# Patient Record
Sex: FEMALE | Race: WHITE | Employment: UNEMPLOYED | URBAN - METROPOLITAN AREA
[De-identification: names, ages, dates, MRNs, and addresses within clinical notes are randomized per-mention and may not be internally consistent; named-entity substitution may affect disease eponyms.]

---

## 2019-08-23 ENCOUNTER — HOSPITAL ENCOUNTER (OUTPATIENT)
Dept: PERINATAL CARE | Age: 34
Discharge: HOME | End: 2019-08-23
Attending: OBSTETRICS & GYNECOLOGY
Payer: OTHER GOVERNMENT

## 2019-08-23 LAB
CHLAMYDIA, EXTERNAL: NEGATIVE
HBSAG, EXTERNAL: NEGATIVE
HIV, EXTERNAL: NEGATIVE
N. GONORRHEA, EXTERNAL: NEGATIVE
RPR, EXTERNAL: NON REACTIVE
RUBELLA, EXTERNAL: NORMAL

## 2019-08-23 PROCEDURE — 76805 OB US >/= 14 WKS SNGL FETUS: CPT | Performed by: OBSTETRICS & GYNECOLOGY

## 2019-10-11 ENCOUNTER — HOSPITAL ENCOUNTER (OUTPATIENT)
Dept: PERINATAL CARE | Age: 34
Discharge: HOME | End: 2019-10-11
Attending: OBSTETRICS & GYNECOLOGY
Payer: OTHER GOVERNMENT

## 2019-10-11 PROCEDURE — 76816 OB US FOLLOW-UP PER FETUS: CPT | Performed by: OBSTETRICS & GYNECOLOGY

## 2019-11-05 LAB — GRBS, EXTERNAL: NEGATIVE

## 2019-11-07 NOTE — H&P
[facility]  HISTORY AND PHYSICAL    Name:  Jose Moulton  MR#:   [id number]  :  1985  ACCOUNT #:  [de-identified]  ADMIT DATE:  2019    She is coming in at approximately noon time for surgery on 2019    CHIEF COMPLAINT:  Intrauterine pregnancy at 44 weeks' gestation, group B strep status negative, for repeat  section, and known fibroid uterus. HISTORY OF PRESENT ILLNESS:  The patient is a 72-year-old  2, para 1, AB 0,  female whose due date is well established to be 2019, also confirmed by Maternal Fetal Medicine with the baby in the 72nd percentile as of 2019, who had mild polyhydramnios which had resolved by her last study at Maternal Fetal Medicine, and who had a PIH panel done also the later part of October, which confirmed normal [address] labs, who is now in at this time with long causing posterior cervix, who has a known anterior fundal placenta and the big fear is a pedunculated fibroid approximately 3 cm, mutation not specified, for repeat  section. The patient delivered by primary  section in  at 39 weeks' gestation of an 8-pound-2-ounce male infant who had fetal distress. The patient had a known fibroid uterus at that time. The patient has also been consulted at this time with the fibroid accessible for removal simply and reasonably we may remove the pedunculated fibroid if I have her stable and without other irregularity may be left in situ. ALLERGIES:  THE PATIENT HAS NO KNOWN DRUG ALLERGIES. SOCIAL HISTORY:  She has quit smoking. Class I Pap smear. The patient's 1-hour sugar test at 30 weeks was normal.  Vitamin D level was 32.2. A nonstress test had all been reactive during these last several weeks. This was done because the initial finding of mild polyhydramnios. The patient's blood type is O positive, class I pap smear, nonreactive VDRL. GC and CT cultures negative.   Rubella immune, hepatitis, and AIDS screens are negative. RPR is nonreactive. FAMILY HISTORY:  The grandparents who had colon cancer and breast cancer. Other specifics not given. Spouse's name is Carlo Rodriguez . REVIEW OF SYSTEMS:  Otherwise noncontributory except for given above. PHYSICAL EXAMINATION:  GENERAL:  Well-developed, well-nourished,  female. VITAL SIGNS:  5 feet 6 inches tall, weight 223 pounds. Blood pressure 121/83. HEAD, EYES, EARS, NOSE, AND THROAT:  Normal.  CHEST:  Clear. BREASTS:  Without extraneous masses. CARDIAC:  Normal.  ABDOMEN:  Reveals an estimated fetal weight of about 7.5 to 8 pounds. Cervix is long, posterior, and closed. The patient has a well-healed Pfannenstiel incision site. Heart beat in the left lower quadrant 140 beats per minute.   Rest of the exam including neurologic is otherwise normal.      Mack Galindo MD      [id number]  D:  11/07/2019 12:24  T:  11/07/2019 17:12  JOB #:  [id number]

## 2019-11-12 ENCOUNTER — HOSPITAL ENCOUNTER (OUTPATIENT)
Dept: PREADMISSION TESTING | Age: 34
Discharge: HOME | DRG: 773 | End: 2019-11-12
Payer: OTHER GOVERNMENT

## 2019-11-12 LAB
ABO + RH BLD: NORMAL
AMORPH CRY URNS QL MICRO: ABNORMAL
APPEARANCE UR: CLEAR
BACTERIA URNS QL MICRO: ABNORMAL /HPF
BASOPHILS # BLD: 0 K/UL (ref 0–0.1)
BASOPHILS NFR BLD: 0 % (ref 0–2)
BILIRUB UR QL: NEGATIVE
BLOOD GROUP ANTIBODIES SERPL: NORMAL
CAOX CRY URNS QL MICRO: ABNORMAL
COLOR UR: YELLOW
DIFFERENTIAL METHOD BLD: ABNORMAL
EOSINOPHIL # BLD: 0.1 K/UL (ref 0–0.4)
EOSINOPHIL NFR BLD: 1 % (ref 0–5)
EPITH CASTS URNS QL MICRO: ABNORMAL /LPF (ref 0–5)
ERYTHROCYTE [DISTWIDTH] IN BLOOD BY AUTOMATED COUNT: 13.7 % (ref 11.6–14.5)
GLUCOSE UR STRIP.AUTO-MCNC: NEGATIVE MG/DL
HCT VFR BLD AUTO: 35 % (ref 35–45)
HGB BLD-MCNC: 11.4 G/DL (ref 12–16)
HGB UR QL STRIP: NEGATIVE
KETONES UR QL STRIP.AUTO: ABNORMAL MG/DL
LEUKOCYTE ESTERASE UR QL STRIP.AUTO: ABNORMAL
LYMPHOCYTES # BLD: 1.3 K/UL (ref 0.9–3.6)
LYMPHOCYTES NFR BLD: 17 % (ref 21–52)
MCH RBC QN AUTO: 28.3 PG (ref 24–34)
MCHC RBC AUTO-ENTMCNC: 32.6 G/DL (ref 31–37)
MCV RBC AUTO: 86.8 FL (ref 74–97)
MONOCYTES # BLD: 0.5 K/UL (ref 0.05–1.2)
MONOCYTES NFR BLD: 7 % (ref 3–10)
NEUTS SEG # BLD: 5.5 K/UL (ref 1.8–8)
NEUTS SEG NFR BLD: 75 % (ref 40–73)
NITRITE UR QL STRIP.AUTO: NEGATIVE
PH UR STRIP: 6.5 [PH] (ref 5–8)
PLATELET # BLD AUTO: 270 K/UL (ref 135–420)
PMV BLD AUTO: 10.5 FL (ref 9.2–11.8)
PROT UR STRIP-MCNC: NEGATIVE MG/DL
RBC # BLD AUTO: 4.03 M/UL (ref 4.2–5.3)
RBC #/AREA URNS HPF: ABNORMAL /HPF (ref 0–5)
SP GR UR REFRACTOMETRY: 1.02 (ref 1–1.03)
SPECIMEN EXP DATE BLD: NORMAL
UROBILINOGEN UR QL STRIP.AUTO: 1 EU/DL (ref 0.2–1)
WBC # BLD AUTO: 7.3 K/UL (ref 4.6–13.2)
WBC URNS QL MICRO: ABNORMAL /HPF (ref 0–5)

## 2019-11-12 PROCEDURE — 86900 BLOOD TYPING SEROLOGIC ABO: CPT

## 2019-11-12 PROCEDURE — 86592 SYPHILIS TEST NON-TREP QUAL: CPT

## 2019-11-12 PROCEDURE — 81001 URINALYSIS AUTO W/SCOPE: CPT

## 2019-11-12 PROCEDURE — 36415 COLL VENOUS BLD VENIPUNCTURE: CPT

## 2019-11-12 PROCEDURE — 85025 COMPLETE CBC W/AUTO DIFF WBC: CPT

## 2019-11-13 ENCOUNTER — ANESTHESIA (OUTPATIENT)
Dept: LABOR AND DELIVERY | Age: 34
DRG: 773 | End: 2019-11-13
Payer: OTHER GOVERNMENT

## 2019-11-13 ENCOUNTER — ANESTHESIA EVENT (OUTPATIENT)
Dept: LABOR AND DELIVERY | Age: 34
DRG: 773 | End: 2019-11-13
Payer: OTHER GOVERNMENT

## 2019-11-13 ENCOUNTER — HOSPITAL ENCOUNTER (INPATIENT)
Age: 34
LOS: 2 days | Discharge: HOME | DRG: 773 | End: 2019-11-15
Attending: OBSTETRICS & GYNECOLOGY | Admitting: OBSTETRICS & GYNECOLOGY
Payer: OTHER GOVERNMENT

## 2019-11-13 PROBLEM — Z3A.39 39 WEEKS GESTATION OF PREGNANCY: Status: ACTIVE | Noted: 2019-11-13

## 2019-11-13 PROBLEM — Z3A.39 39 WEEKS GESTATION OF PREGNANCY: Status: RESOLVED | Noted: 2019-11-13 | Resolved: 2019-11-13

## 2019-11-13 PROBLEM — Z98.891 H/O CESAREAN SECTION: Status: ACTIVE | Noted: 2019-11-13

## 2019-11-13 PROBLEM — Z98.891 H/O CESAREAN SECTION: Status: RESOLVED | Noted: 2019-11-13 | Resolved: 2019-11-13

## 2019-11-13 LAB — RPR SER QL: NONREACTIVE

## 2019-11-13 PROCEDURE — 77030002888 HC SUT CHRMC J&J -A: Performed by: OBSTETRICS & GYNECOLOGY

## 2019-11-13 PROCEDURE — 59025 FETAL NON-STRESS TEST: CPT

## 2019-11-13 PROCEDURE — 74011250637 HC RX REV CODE- 250/637

## 2019-11-13 PROCEDURE — 74011250636 HC RX REV CODE- 250/636

## 2019-11-13 PROCEDURE — 74011250636 HC RX REV CODE- 250/636: Performed by: NURSE ANESTHETIST, CERTIFIED REGISTERED

## 2019-11-13 PROCEDURE — 74011000258 HC RX REV CODE- 258: Performed by: NURSE ANESTHETIST, CERTIFIED REGISTERED

## 2019-11-13 PROCEDURE — 77030040361 HC SLV COMPR DVT MDII -B: Performed by: OBSTETRICS & GYNECOLOGY

## 2019-11-13 PROCEDURE — 77030027138 HC INCENT SPIROMETER -A

## 2019-11-13 PROCEDURE — 76010000392 HC C SECN EA ADDL 0.5 HR: Performed by: OBSTETRICS & GYNECOLOGY

## 2019-11-13 PROCEDURE — 75410000003 HC RECOV DEL/VAG/CSECN EA 0.5 HR

## 2019-11-13 PROCEDURE — 74011000250 HC RX REV CODE- 250: Performed by: SPECIALIST

## 2019-11-13 PROCEDURE — 88305 TISSUE EXAM BY PATHOLOGIST: CPT

## 2019-11-13 PROCEDURE — 77030031139 HC SUT VCRL2 J&J -A: Performed by: OBSTETRICS & GYNECOLOGY

## 2019-11-13 PROCEDURE — 74011000250 HC RX REV CODE- 250: Performed by: NURSE ANESTHETIST, CERTIFIED REGISTERED

## 2019-11-13 PROCEDURE — 76060000035 HC ANESTHESIA 2 TO 2.5 HR: Performed by: OBSTETRICS & GYNECOLOGY

## 2019-11-13 PROCEDURE — 74011250636 HC RX REV CODE- 250/636: Performed by: OBSTETRICS & GYNECOLOGY

## 2019-11-13 PROCEDURE — 77030002933 HC SUT MCRYL J&J -A: Performed by: OBSTETRICS & GYNECOLOGY

## 2019-11-13 PROCEDURE — 65270000029 HC RM PRIVATE

## 2019-11-13 PROCEDURE — 75410000003 HC RECOV DEL/VAG/CSECN EA 0.5 HR: Performed by: OBSTETRICS & GYNECOLOGY

## 2019-11-13 PROCEDURE — 88311 DECALCIFY TISSUE: CPT

## 2019-11-13 PROCEDURE — 0UB90ZZ EXCISION OF UTERUS, OPEN APPROACH: ICD-10-PCS | Performed by: OBSTETRICS & GYNECOLOGY

## 2019-11-13 PROCEDURE — 74011250636 HC RX REV CODE- 250/636: Performed by: SPECIALIST

## 2019-11-13 PROCEDURE — 76010000391 HC C SECN FIRST 1 HR: Performed by: OBSTETRICS & GYNECOLOGY

## 2019-11-13 RX ORDER — EPHEDRINE SULFATE/0.9% NACL/PF 50 MG/5 ML
SYRINGE (ML) INTRAVENOUS AS NEEDED
Status: DISCONTINUED | OUTPATIENT
Start: 2019-11-13 | End: 2019-11-13 | Stop reason: HOSPADM

## 2019-11-13 RX ORDER — NALOXONE HYDROCHLORIDE 0.4 MG/ML
0.4 INJECTION, SOLUTION INTRAMUSCULAR; INTRAVENOUS; SUBCUTANEOUS
Status: ACTIVE | OUTPATIENT
Start: 2019-11-13 | End: 2019-11-14

## 2019-11-13 RX ORDER — SODIUM CHLORIDE, SODIUM LACTATE, POTASSIUM CHLORIDE, CALCIUM CHLORIDE 600; 310; 30; 20 MG/100ML; MG/100ML; MG/100ML; MG/100ML
125 INJECTION, SOLUTION INTRAVENOUS CONTINUOUS
Status: DISCONTINUED | OUTPATIENT
Start: 2019-11-13 | End: 2019-11-15 | Stop reason: HOSPADM

## 2019-11-13 RX ORDER — BUPIVACAINE HYDROCHLORIDE 7.5 MG/ML
INJECTION, SOLUTION INTRASPINAL AS NEEDED
Status: DISCONTINUED | OUTPATIENT
Start: 2019-11-13 | End: 2019-11-13 | Stop reason: HOSPADM

## 2019-11-13 RX ORDER — DIPHENHYDRAMINE HYDROCHLORIDE 50 MG/ML
25 INJECTION, SOLUTION INTRAMUSCULAR; INTRAVENOUS
Status: DISCONTINUED | OUTPATIENT
Start: 2019-11-13 | End: 2019-11-15 | Stop reason: HOSPADM

## 2019-11-13 RX ORDER — PROMETHAZINE HYDROCHLORIDE 25 MG/ML
25 INJECTION, SOLUTION INTRAMUSCULAR; INTRAVENOUS
Status: DISCONTINUED | OUTPATIENT
Start: 2019-11-13 | End: 2019-11-15 | Stop reason: HOSPADM

## 2019-11-13 RX ORDER — ACETAMINOPHEN 325 MG/1
650 TABLET ORAL
Status: DISCONTINUED | OUTPATIENT
Start: 2019-11-13 | End: 2019-11-15 | Stop reason: HOSPADM

## 2019-11-13 RX ORDER — ONDANSETRON 2 MG/ML
4 INJECTION INTRAMUSCULAR; INTRAVENOUS
Status: DISCONTINUED | OUTPATIENT
Start: 2019-11-13 | End: 2019-11-15 | Stop reason: HOSPADM

## 2019-11-13 RX ORDER — OXYTOCIN/RINGER'S LACTATE 20/1000 ML
PLASTIC BAG, INJECTION (ML) INTRAVENOUS
Status: DISCONTINUED | OUTPATIENT
Start: 2019-11-13 | End: 2019-11-13 | Stop reason: HOSPADM

## 2019-11-13 RX ORDER — NALOXONE HYDROCHLORIDE 0.4 MG/ML
0.2 INJECTION, SOLUTION INTRAMUSCULAR; INTRAVENOUS; SUBCUTANEOUS
Status: ACTIVE | OUTPATIENT
Start: 2019-11-13 | End: 2019-11-14

## 2019-11-13 RX ORDER — MORPHINE SULFATE 1 MG/ML
INJECTION, SOLUTION EPIDURAL; INTRATHECAL; INTRAVENOUS AS NEEDED
Status: DISCONTINUED | OUTPATIENT
Start: 2019-11-13 | End: 2019-11-13 | Stop reason: HOSPADM

## 2019-11-13 RX ORDER — KETOROLAC TROMETHAMINE 30 MG/ML
INJECTION, SOLUTION INTRAMUSCULAR; INTRAVENOUS
Status: COMPLETED
Start: 2019-11-13 | End: 2019-11-13

## 2019-11-13 RX ORDER — CEFAZOLIN SODIUM/WATER 2 G/20 ML
2 SYRINGE (ML) INTRAVENOUS EVERY 6 HOURS
Status: DISCONTINUED | OUTPATIENT
Start: 2019-11-13 | End: 2019-11-13 | Stop reason: HOSPADM

## 2019-11-13 RX ORDER — LORATADINE 10 MG/1
10 TABLET ORAL DAILY PRN
Status: DISCONTINUED | OUTPATIENT
Start: 2019-11-13 | End: 2019-11-15 | Stop reason: HOSPADM

## 2019-11-13 RX ORDER — METHYLERGONOVINE MALEATE 0.2 MG/ML
INJECTION INTRAVENOUS AS NEEDED
Status: DISCONTINUED | OUTPATIENT
Start: 2019-11-13 | End: 2019-11-13 | Stop reason: HOSPADM

## 2019-11-13 RX ORDER — KETOROLAC TROMETHAMINE 30 MG/ML
30 INJECTION, SOLUTION INTRAMUSCULAR; INTRAVENOUS
Status: DISCONTINUED | OUTPATIENT
Start: 2019-11-13 | End: 2019-11-15 | Stop reason: HOSPADM

## 2019-11-13 RX ORDER — KETOROLAC TROMETHAMINE 30 MG/ML
30 INJECTION, SOLUTION INTRAMUSCULAR; INTRAVENOUS EVERY 6 HOURS
Status: DISCONTINUED | OUTPATIENT
Start: 2019-11-13 | End: 2019-11-15

## 2019-11-13 RX ORDER — CEFAZOLIN SODIUM/WATER 2 G/20 ML
2 SYRINGE (ML) INTRAVENOUS ONCE
Status: DISCONTINUED | OUTPATIENT
Start: 2019-11-13 | End: 2019-11-13

## 2019-11-13 RX ORDER — IBUPROFEN 400 MG/1
800 TABLET ORAL
Status: DISCONTINUED | OUTPATIENT
Start: 2019-11-16 | End: 2019-11-15

## 2019-11-13 RX ORDER — OXYTOCIN/0.9 % SODIUM CHLORIDE 20/1000 ML
125 PLASTIC BAG, INJECTION (ML) INTRAVENOUS CONTINUOUS
Status: DISCONTINUED | OUTPATIENT
Start: 2019-11-13 | End: 2019-11-15 | Stop reason: HOSPADM

## 2019-11-13 RX ORDER — OXYTOCIN/0.9 % SODIUM CHLORIDE 20/1000 ML
PLASTIC BAG, INJECTION (ML) INTRAVENOUS
Status: COMPLETED
Start: 2019-11-13 | End: 2019-11-13

## 2019-11-13 RX ORDER — SODIUM CHLORIDE, SODIUM LACTATE, POTASSIUM CHLORIDE, CALCIUM CHLORIDE 600; 310; 30; 20 MG/100ML; MG/100ML; MG/100ML; MG/100ML
125 INJECTION, SOLUTION INTRAVENOUS CONTINUOUS
Status: DISPENSED | OUTPATIENT
Start: 2019-11-13 | End: 2019-11-14

## 2019-11-13 RX ORDER — SODIUM CHLORIDE 0.9 % (FLUSH) 0.9 %
5-40 SYRINGE (ML) INJECTION EVERY 8 HOURS
Status: DISCONTINUED | OUTPATIENT
Start: 2019-11-13 | End: 2019-11-15 | Stop reason: HOSPADM

## 2019-11-13 RX ORDER — MISOPROSTOL 100 UG/1
1000 TABLET ORAL ONCE
Status: COMPLETED | OUTPATIENT
Start: 2019-11-13 | End: 2019-11-13

## 2019-11-13 RX ORDER — ZOLPIDEM TARTRATE 5 MG/1
5 TABLET ORAL
Status: DISCONTINUED | OUTPATIENT
Start: 2019-11-13 | End: 2019-11-15 | Stop reason: HOSPADM

## 2019-11-13 RX ORDER — HYDROMORPHONE HYDROCHLORIDE 1 MG/ML
0.2 INJECTION, SOLUTION INTRAMUSCULAR; INTRAVENOUS; SUBCUTANEOUS
Status: DISPENSED | OUTPATIENT
Start: 2019-11-13 | End: 2019-11-14

## 2019-11-13 RX ORDER — SODIUM CHLORIDE 0.9 % (FLUSH) 0.9 %
5-40 SYRINGE (ML) INJECTION AS NEEDED
Status: DISCONTINUED | OUTPATIENT
Start: 2019-11-13 | End: 2019-11-15 | Stop reason: HOSPADM

## 2019-11-13 RX ORDER — MISOPROSTOL 100 UG/1
TABLET ORAL
Status: COMPLETED
Start: 2019-11-13 | End: 2019-11-13

## 2019-11-13 RX ORDER — ACETAMINOPHEN 325 MG/1
650 TABLET ORAL
Status: DISCONTINUED | OUTPATIENT
Start: 2019-11-13 | End: 2019-11-13

## 2019-11-13 RX ORDER — FACIAL-BODY WIPES
10 EACH TOPICAL
Status: DISCONTINUED | OUTPATIENT
Start: 2019-11-13 | End: 2019-11-15 | Stop reason: HOSPADM

## 2019-11-13 RX ORDER — ONDANSETRON 2 MG/ML
INJECTION INTRAMUSCULAR; INTRAVENOUS AS NEEDED
Status: DISCONTINUED | OUTPATIENT
Start: 2019-11-13 | End: 2019-11-13 | Stop reason: HOSPADM

## 2019-11-13 RX ORDER — OXYCODONE AND ACETAMINOPHEN 5; 325 MG/1; MG/1
1-2 TABLET ORAL
Status: DISCONTINUED | OUTPATIENT
Start: 2019-11-13 | End: 2019-11-15 | Stop reason: HOSPADM

## 2019-11-13 RX ORDER — CEFAZOLIN SODIUM/WATER 2 G/20 ML
2 SYRINGE (ML) INTRAVENOUS ONCE
Status: DISCONTINUED | OUTPATIENT
Start: 2019-11-13 | End: 2019-11-13 | Stop reason: HOSPADM

## 2019-11-13 RX ORDER — SIMETHICONE 80 MG
80 TABLET,CHEWABLE ORAL
Status: DISCONTINUED | OUTPATIENT
Start: 2019-11-13 | End: 2019-11-15 | Stop reason: HOSPADM

## 2019-11-13 RX ORDER — LIDOCAINE HYDROCHLORIDE 10 MG/ML
INJECTION INFILTRATION; PERINEURAL AS NEEDED
Status: DISCONTINUED | OUTPATIENT
Start: 2019-11-13 | End: 2019-11-13 | Stop reason: HOSPADM

## 2019-11-13 RX ORDER — FENTANYL CITRATE 50 UG/ML
INJECTION, SOLUTION INTRAMUSCULAR; INTRAVENOUS AS NEEDED
Status: DISCONTINUED | OUTPATIENT
Start: 2019-11-13 | End: 2019-11-13 | Stop reason: HOSPADM

## 2019-11-13 RX ADMIN — KETOROLAC TROMETHAMINE 30 MG: 30 INJECTION, SOLUTION INTRAMUSCULAR at 18:40

## 2019-11-13 RX ADMIN — Medication 125 ML/HR: at 18:32

## 2019-11-13 RX ADMIN — FENTANYL CITRATE 20 MCG: 50 INJECTION, SOLUTION INTRAMUSCULAR; INTRAVENOUS at 16:14

## 2019-11-13 RX ADMIN — Medication 10 MG: at 16:30

## 2019-11-13 RX ADMIN — SODIUM CHLORIDE, SODIUM LACTATE, POTASSIUM CHLORIDE, AND CALCIUM CHLORIDE: 600; 310; 30; 20 INJECTION, SOLUTION INTRAVENOUS at 16:16

## 2019-11-13 RX ADMIN — MISOPROSTOL 1000 MCG: 100 TABLET ORAL at 18:00

## 2019-11-13 RX ADMIN — PHENYLEPHRINE HYDROCHLORIDE 100 MCG: 10 INJECTION INTRAVENOUS at 16:19

## 2019-11-13 RX ADMIN — ONDANSETRON HYDROCHLORIDE 4 MG: 2 INJECTION INTRAMUSCULAR; INTRAVENOUS at 16:45

## 2019-11-13 RX ADMIN — MORPHINE SULFATE 0.15 MG: 1 INJECTION, SOLUTION EPIDURAL; INTRATHECAL; INTRAVENOUS at 16:14

## 2019-11-13 RX ADMIN — LIDOCAINE HYDROCHLORIDE 8 ML: 10 INJECTION, SOLUTION INFILTRATION; PERINEURAL at 16:10

## 2019-11-13 RX ADMIN — METHYLERGONOVINE MALEATE 0.2 MG: 0.2 INJECTION, SOLUTION INTRAMUSCULAR; INTRAVENOUS at 16:44

## 2019-11-13 RX ADMIN — Medication 999 ML/HR: at 16:40

## 2019-11-13 RX ADMIN — KETOROLAC TROMETHAMINE 30 MG: 30 INJECTION, SOLUTION INTRAMUSCULAR; INTRAVENOUS at 18:40

## 2019-11-13 RX ADMIN — PHENYLEPHRINE HYDROCHLORIDE 100 MCG: 10 INJECTION INTRAVENOUS at 16:24

## 2019-11-13 RX ADMIN — Medication 2 G: at 16:12

## 2019-11-13 RX ADMIN — SODIUM CHLORIDE, SODIUM LACTATE, POTASSIUM CHLORIDE, AND CALCIUM CHLORIDE 125 ML/HR: 600; 310; 30; 20 INJECTION, SOLUTION INTRAVENOUS at 15:00

## 2019-11-13 RX ADMIN — BUPIVACAINE HYDROCHLORIDE IN DEXTROSE 1.5 ML: 7.5 INJECTION, SOLUTION SUBARACHNOID at 16:14

## 2019-11-13 RX ADMIN — SODIUM CHLORIDE, SODIUM LACTATE, POTASSIUM CHLORIDE, AND CALCIUM CHLORIDE 1000 ML: 600; 310; 30; 20 INJECTION, SOLUTION INTRAVENOUS at 13:30

## 2019-11-13 RX ADMIN — Medication 10 MG: at 16:32

## 2019-11-13 NOTE — PROGRESS NOTES
18 Pt is a 30 y.o.  at 39w0d, arrived to room 3 for scheduled  with Dr. Kaylan Clark. She states she is has no pain. no contractions, no vaginal bleeding, Denies LOF, Normal FM. Tindall and EFM placed. 1400 Dr. Alicia Wilson aware of patient drinking coffee with cream and sugar. To delay  until 1600. Dr. Kaylan Clark notified. 1601 arrived to OR    1620 fetal heart rate 150    1802 Patient back in room 3 from [address] [place] [address] Bedside and Verbal shift change report given to ALICIA Alvaerz RN (oncoming nurse) by Kristopher Saenz. MERNA Dasilva (offgoing nurse). Report included the following information SBAR, Kardex, Intake/Output, MAR, Recent Results and Med Rec Status.

## 2019-11-13 NOTE — PROGRESS NOTES
Problem:  Delivery: Day of Delivery  Goal: Off Pathway (Use only if patient is Off Pathway)  Outcome: Progressing Towards Goal  Goal: Activity/Safety  Outcome: Progressing Towards Goal  Goal: Consults, if ordered  Outcome: Progressing Towards Goal  Goal: Diagnostic Test/Procedures  Outcome: Progressing Towards Goal  Goal: Nutrition/Diet  Outcome: Progressing Towards Goal  Goal: Discharge Planning  Outcome: Progressing Towards Goal  Goal: Medications  Outcome: Progressing Towards Goal  Goal: Respiratory  Outcome: Progressing Towards Goal  Goal: Treatments/Interventions/Procedures  Outcome: Progressing Towards Goal  Goal: Psychosocial  Outcome: Progressing Towards Goal  Goal: *Vital signs within defined limits  Outcome: Progressing Towards Goal  Goal: *Labs within defined limits  Outcome: Progressing Towards Goal  Goal: *Hemodynamically stable  Outcome: Progressing Towards Goal  Goal: *Optimal pain control at patient's stated goal  Outcome: Progressing Towards Goal  Goal: *Participates in infant care  Outcome: Progressing Towards Goal  Goal: *Demonstrates progressive activity  Outcome: Progressing Towards Goal  Goal: *Tolerating diet  Outcome: Progressing Towards Goal

## 2019-11-13 NOTE — ANESTHESIA PREPROCEDURE EVALUATION
Relevant Problems   No relevant active problems       Anesthetic History   No history of anesthetic complications     Pertinent negatives: No PONV       Review of Systems / Medical History  Patient summary reviewed, nursing notes reviewed and pertinent labs reviewed    Pulmonary              Pertinent negatives: No COPD, asthma and smoker (quit in april)     Neuro/Psych   Within defined limits           Cardiovascular  Within defined limits              Pertinent negatives: No hypertension, past MI and CAD       GI/Hepatic/Renal  Within defined limits           Pertinent negatives: No liver disease and renal disease   Endo/Other  Within defined limits        Pertinent negatives: No diabetes   Other Findings              Physical Exam    Airway  Mallampati: III  TM Distance: 4 - 6 cm  Neck ROM: normal range of motion   Mouth opening: Normal     Cardiovascular               Dental         Pulmonary                 Abdominal         Other Findings            Anesthetic Plan    ASA: 2  Anesthesia type: spinal          Induction: Intravenous  Anesthetic plan and risks discussed with: Patient

## 2019-11-13 NOTE — INTERVAL H&P NOTE
H&P Update: 
Robbie Casatñeda was seen and examined. History and physical has been reviewed. The patient has been examined.  There have been no significant clinical changes since the completion of the originally dated History and Physical.

## 2019-11-13 NOTE — OP NOTES
Section Delivery Procedure Note    Name: Trish Benitez Record Number: [id number]   YOB: 1985  Today's Date: 2019      Preoperative Diagnosis: PREVIOUS  SECTION fibroid uterus breech    Postoperative Diagnosis: imelda and stage 3 abd pelvic uterine fibroid adhesions    Procedure: Low Cervical Transverse Procedure(s):fibroidectomy enterolysis abd/pelvic adhesions  REPEAT  SECTION    Surgeon(s):  Destinee Park MD    Anesthesia: Epidural    Assistants: Circ-1: Krish Brand RN  Scrub Tech-1: Becki Painting Tasks:  Retraction and Closing      Prenatal Labs:   Lab Results   Component Value Date/Time    ABO/Rh(D) Sera Curly POSITIVE 2019 04:26 PM        Prophylactic Antibiotics: Ancef pre-op Ancef pre-delivery 1 doses. Procedure Details:    See dictation.  Ticket number [id number]      Estimated Blood Loss: 1000 ml   Replacement: o    Fetal Description: schaeffer female    Apgar - One Minute: 9    Apgar - Five Minutes: 9    Umbilical Cord: Nuchal Cord x  3             Cord Blood Results:   Information for the patient's :  Gambier Rape [[id number]]   No results found for: PCTABR, PCTDIG, BILI, ABORH         Birth Information:   Information for the patient's :  Gambier Rape [[id number]]          Placenta:  manual removal    Specimens: fibroid uterine pedunculated excised to path  ID Type Source Tests Collected by Time Destination   1 : uterine fibroid    Destinee Park MD 2019 1657 Pathology          Maternal Findings    Uterus Size: enlarged, Fibroids: yes , Adhesions: {Moderate, Anomalies: None Defects: no   Tubes normal   Ovaries normal   Abdomen Adhesions: Mild to Moderate   Pelvis Adhesions: Mild to Moderate            Complications:  none     Drains:  scott       Birth Weight: 7#11Oz        Mother's Condition: good  Baby's Condition: good    Signed: Teri Isaacs MD      2019

## 2019-11-13 NOTE — ANESTHESIA PROCEDURE NOTES
Spinal Block    Start time: 11/13/2019 4:09 PM  End time: 11/13/2019 4:15 PM  Performed by: Adrienne Garsia MD  Authorized by: Adrienne Garsia MD     Pre-procedure: Indications: primary anesthetic  Preanesthetic Checklist: risks and benefits discussed, site marked and timeout performed      Spinal Block:   Patient Position:  Seated  Prep Region:  Lumbar  Prep: chlorhexidine      Location:  L4-5          Needle:   Needle Type:  Pencil-tip  Needle Gauge:  25 G  Attempts:  1      Events: CSF confirmed, no blood with aspiration and no paresthesia        Assessment:  Insertion:  Uncomplicated    Spinal Placement    Patient placed in sitting position, back prepped and draped. Lidocaine infiltrated,   Needle placed. Spinal fluid obtained. Crystal clear. Single pass. Inject      Good flow before, mid injection and after.

## 2019-11-14 LAB
HCT VFR BLD AUTO: 33.8 % (ref 35–45)
HGB BLD-MCNC: 11 G/DL (ref 12–16)

## 2019-11-14 PROCEDURE — 74011250636 HC RX REV CODE- 250/636: Performed by: OBSTETRICS & GYNECOLOGY

## 2019-11-14 PROCEDURE — 85014 HEMATOCRIT: CPT

## 2019-11-14 PROCEDURE — 65270000029 HC RM PRIVATE

## 2019-11-14 PROCEDURE — 85018 HEMOGLOBIN: CPT

## 2019-11-14 PROCEDURE — 36415 COLL VENOUS BLD VENIPUNCTURE: CPT

## 2019-11-14 PROCEDURE — 74011250637 HC RX REV CODE- 250/637: Performed by: OBSTETRICS & GYNECOLOGY

## 2019-11-14 RX ADMIN — KETOROLAC TROMETHAMINE 30 MG: 30 INJECTION, SOLUTION INTRAMUSCULAR at 18:48

## 2019-11-14 RX ADMIN — KETOROLAC TROMETHAMINE 30 MG: 30 INJECTION, SOLUTION INTRAMUSCULAR at 00:31

## 2019-11-14 RX ADMIN — KETOROLAC TROMETHAMINE 30 MG: 30 INJECTION, SOLUTION INTRAMUSCULAR at 12:47

## 2019-11-14 RX ADMIN — KETOROLAC TROMETHAMINE 30 MG: 30 INJECTION, SOLUTION INTRAMUSCULAR at 06:42

## 2019-11-14 RX ADMIN — OXYCODONE HYDROCHLORIDE AND ACETAMINOPHEN 1 TABLET: 5; 325 TABLET ORAL at 21:09

## 2019-11-14 RX ADMIN — SIMETHICONE CHEW TAB 80 MG 80 MG: 80 TABLET ORAL at 07:47

## 2019-11-14 RX ADMIN — SODIUM CHLORIDE, SODIUM LACTATE, POTASSIUM CHLORIDE, AND CALCIUM CHLORIDE 125 ML/HR: 600; 310; 30; 20 INJECTION, SOLUTION INTRAVENOUS at 04:00

## 2019-11-14 RX ADMIN — OXYCODONE HYDROCHLORIDE AND ACETAMINOPHEN 1 TABLET: 5; 325 TABLET ORAL at 17:12

## 2019-11-14 RX ADMIN — SIMETHICONE CHEW TAB 80 MG 80 MG: 80 TABLET ORAL at 14:16

## 2019-11-14 RX ADMIN — SIMETHICONE CHEW TAB 80 MG 80 MG: 80 TABLET ORAL at 21:09

## 2019-11-14 NOTE — PROGRESS NOTES
Progress Note      Patient: Padmini Guadalupe               Sex: female          DOA: 11/13/2019         YOB: 1985      Age:  30 y.o.        LOS:  LOS: 1 day               Subjective:     No cc    Objective:      Visit Vitals  /75   Pulse 82   Temp 97.6 °F (36.4 °C)   Resp 16   Ht 5' 6.5\" (1.689 m)   Wt 104.8 kg (231 lb)   SpO2 100%   Breastfeeding? Unknown   BMI 36.73 kg/m²       Physical Exam:  Pertinent items are noted in HPI. Intake and Output:  Current Shift:  11/13 1901 - 11/14 0700  In: 250 [P.O.:250]  Out: 800 [Urine:800]  Last three shifts:  11/12 0701 - 11/13 1900  In: 1700 [I.V.:1700]  Out: 1075 [Urine:75]    Lab/Data Reviewed: All lab results for the last 24 hours reviewed. Medications Reviewed    Continued hospitalization is indicated due to pop day 1      Assessment/Plan     Active Problems:    * No active hospital problems.  *      Resolving ilius    dc set for tomorrow pm

## 2019-11-14 NOTE — PROGRESS NOTES
2019  9:23 AM    Anesthesia  Post-Op Rounding Note  Daily Management of Intrathecal Opioid    Referring physician: Andria Steel MD   Patient status post Procedure(s):  REPEAT  SECTION on 2019    POST OP Day #1    Visit Vitals  /73 (BP 1 Location: Right arm, BP Patient Position: At rest)   Pulse 91   Temp 36.9 °C (98.5 °F)   Resp 16   Ht 5' 6.5\" (1.689 m)   Wt 104.8 kg (231 lb)   SpO2 96%   Breastfeeding? Unknown   BMI 36.73 kg/m²             Patient rates pain 2 out of 10. Pain is subjectively rated by patient as mild . No sedation, pruritis, or nausea noted. No complications, adequate analgesia. Continue current postop pain regimen.       Jaylen Hodge MD

## 2019-11-14 NOTE — PROGRESS NOTES
2030 TRANSFER - IN REPORT:    Verbal report received from ALICIA Alvarez RN(name) on Michela Mccurdy  being received from L&D(unit) for routine progression of care      Report consisted of patients Situation, Background, Assessment and   Recommendations(SBAR). Information from the following report(s) SBAR, Kardex, Intake/Output, MAR and Recent Results was reviewed with the receiving nurse. 2045 Assessment completed upon patients arrival to unit and care assumed. 2230 infant vitals checked. Mother in the phone at this time. No other needs to be addressed at this time. 0030 scheduled toradol given. 0110 scott removed pt ambulated to restroom. Taught consuelo care. Consuelo pad and underpants placed. instructed patient to continue to drink plenty of water and inform nurse when feeling the urge to urinate. Informed pt importance of urinating within 6 hours post scott removal.   0115 pt walked to nursery to watch infant bath. 0215 pt in room. No other needs to be addressed at this time. 0400 pt ambulated to restroom voided 200ml without difficulty. No other needs to be addressed at this time. 0545 pt attempting to feed. Infant at this time. No other needs to be addressed at this time. 0715 Bedside and Verbal shift change report given to ALICIA Mclaughlin LPN (oncoming nurse) by Cristian Garcia RN (offgoing nurse). Report included the following information SBAR, Kardex, Intake/Output, MAR and Recent Results.

## 2019-11-14 NOTE — DISCHARGE SUMMARY
[facility] SUMMARY    Name:  Taryn Rueda  MR#:   [id number]  :  1985  ACCOUNT #:  [de-identified]  ADMIT DATE:  2019  DISCHARGE DATE:  11/15/2019    ADMISSION DIAGNOSES:  Pregnancy at 39 weeks gestation, fibroid uterus, for repeat  section, group B Strep status negative. DISCHARGE DIAGNOSES:  Pregnancy at 39 weeks gestation, fibroid uterus, for repeat  section, group B Strep status negative, and intrauterine pregnancy delivered by repeat low transverse  section delivery of intrauterine pregnancy and status post operative enterolysis of anterior abdominal and uterine adhesions, and fibroidectomy. PATHOLOGY PENDING:  Fibroid. History and physical that was dictated on 2019 had numerals [id number]. LABORATORY DATA:  Laboratories not included in that dictation are as follows:  H and H are 35 and 11.4; white count 7,300; platelet count 133,667. O positive blood type. Negative screen. Urinalysis normal.    HOSPITAL COURSE:  The patient delivered at about 6:00 p.m. on 2019 of an Apgar of 9 and 9, 7 pounds and 11 ounces female infant in breech presentation, and the patient also had fibroidectomy performed and lysis of adhesions. Fibroid specimen was submitted to Pathology. Postoperatively, the patient did well. The patient was on Ancef prophylaxis and advanced to regular diet by the first postoperative day. Physical examination on postoperative day #1 reveals normal bowel sounds. Incision was dry. Uterus was firm, nontender, about 14-week equivalent size. Lochia scant. Chest and cardiac examination normal.  The patient is breast-feeding. Extremities are normal.  After questioning and consulting with the patient, tomorrow evening will be 5:00 as far as discharge depending on how she continues her recovery. CBC is just being drawn and is still pending.     PLAN:  Plans are to advance the diet today, for setting the patient for discharge tomorrow on 11/15/2019 in the evening. She has got her Percocet prescription at home and has prenatal vitamins at home. Postoperative instructions have been given. Couples are going to be using foam and condoms for birth control, and the fibroid specimen pathology will also be reviewed at her 2-week visit. The patient has been instructed to return to the office in 2 and 6 weeks. She has her appointments for her postoperative check-up. The patient also has been given her postoperative instructions.       Yulisa Leonardo MD      [id number]  D:  11/14/2019 5:42  T:  11/14/2019 9:37  JOB #:  [id number]  CC:

## 2019-11-14 NOTE — ANESTHESIA POSTPROCEDURE EVALUATION
Procedure(s):  REPEAT  SECTION. spinal    Anesthesia Post Evaluation        Comments: Post-Anesthesia Evaluation and Assessment    Cardiovascular Function/Vital Signs  /81   Pulse 78   Temp 36.3 °C (97.4 °F)   Resp 20   Ht 5' 6.5\" (1.689 m)   Wt 104.8 kg (231 lb)   SpO2 97%   Breastfeeding? Unknown   BMI 36.73 kg/m²     Patient is status post Procedure(s):  REPEAT  SECTION. Nausea/Vomiting: Controlled. Postoperative hydration reviewed and adequate. Pain:  Pain Scale 1: Numeric (0 - 10) (19 1853)  Pain Intensity 1: 4 (19)   Managed. Neurological Status:   Neuro (WDL): Within Defined Limits (19 1317)   At baseline. Mental Status and Level of Consciousness: Arousable. Pulmonary Status:   O2 Device: Room air (19 8789)   Adequate oxygenation and airway patent. Complications related to anesthesia: None    Post-anesthesia assessment completed. No concerns. Patient has met all discharge requirements. Signed By: Nilsa Jimenez MD    2019                     Vitals Value Taken Time   /84 2019  7:16 PM   Temp     Pulse 105 2019  7:16 PM   Resp     SpO2 95 % 2019  7:13 PM   Vitals shown include unvalidated device data.

## 2019-11-14 NOTE — PROGRESS NOTES
Assumed care of pt.  0820-Breast feeding. 0925-awake in bed. Denies needs  1007-assessment completed by Marisela Rocha RN and student. 1100-ambulating in hallway. 1200-VSS. Denies needs. 1330-Bedside and Verbal shift change report given to STACEY Joseph RN  (oncoming nurse) by ALICIA Mclaughlin LPN (offgoing nurse). Report given with SBAR, Kardex, Intake/Output, MAR and Recent Results.

## 2019-11-14 NOTE — PROGRESS NOTES
Received bedside report from [address] [place] [address] RN using SBAR. Assuming care of pt at this time.

## 2019-11-14 NOTE — PROGRESS NOTES
Ryan Brittney is progressing toward all goals.     Problem: Pain  Goal: *Control of Pain  Outcome: Progressing Towards Goal  Problem: Patient Education: Go to Patient Education Activity  Goal: Patient/Family Education  Outcome: Progressing Towards Goal  Problem:  Delivery: Postpartum Day 1  Goal: Activity/Safety  Outcome: Progressing Towards Goal  Goal: Consults, if ordered  Outcome: Progressing Towards Goal  Goal: Diagnostic Test/Procedures  Outcome: Progressing Towards Goal  Goal: Nutrition/Diet  Outcome: Progressing Towards Goal  Goal: Discharge Planning  Outcome: Progressing Towards Goal  Goal: Medications  Outcome: Progressing Towards Goal  Goal: Respiratory  Outcome: Progressing Towards Goal  Goal: Treatments/Interventions/Procedures  Outcome: Progressing Towards Goal  Goal: Psychosocial  Outcome: Progressing Towards Goal  Goal: *Vital signs within defined limits  Outcome: Progressing Towards Goal  Goal: *Labs within defined limits  Outcome: Progressing Towards Goal  Goal: *Hemodynamically stable  Outcome: Progressing Towards Goal  Goal: *Optimal pain control at patient's stated goal  Outcome: Progressing Towards Goal  Goal: *Participates in infant care  Outcome: Progressing Towards Goal  Goal: *Demonstrates progressive activity  Outcome: Progressing Towards Goal  Goal: *Tolerating diet  Outcome: Progressing Towards Goal  Goal: *Performs self perineal care  Outcome: Progressing Towards Goal

## 2019-11-14 NOTE — LACTATION NOTE
Per mom, infant latching and nursing well. Mom educated on breastfeeding basics--hunger cues, feeding on demand, waking baby if baby sleeps too long between feeds, importance of skin to skin, positioning and latching, risk of pacifier use and supplemental feedings, and importance of rooming in--and use of log sheet. Mom also educated on benefits of breastfeeding for herself and baby. Mom verbalized understanding. No questions at this time.

## 2019-11-14 NOTE — OP NOTES
[facility] [place]  OPERATIVE REPORT    Name:  Bhavesh House  MR#:   [id number]  :  1985  ACCOUNT #:  [de-identified]  DATE OF SERVICE:  2019    PREOPERATIVE DIAGNOSES:  Intrauterine pregnancy at 39 weeks gestation, question malpresentation, group B Strep status negative, fibroid uterus, for repeat  section, also potential fibroidectomy. POSTOPERATIVE DIAGNOSES:  Intrauterine pregnancy at 39 weeks gestation, question malpresentation, group B Strep status negative, and fibroid uterus, for repeat  section and also potential fibroidectomy, and intrauterine pregnancy delivered by low transverse section delivery, intrauterine pregnancy footling breech presentation, three snug nuchal cords, enterolysis of omental bowel, uterus fibroid adhesions, indicated fibroidectomy and uterine repair as needed. PROCEDURE PERFORMED:  [facility] fibroidectomy    SURGEON:  Luis Miguel Brennan MD    ASSISTANT:  See circ note    ANESTHESIA:  Spinal Duramorph. COMPLICATIONS:  None. TRANSFUSIONS:  None. PATHOLOGY SUBMITTED:  Fibroid. PROPHYLAXIS ON BOARD:  Ancef, Tagamet, and pneumatic stockings. IMPLANTS:  o    ESTIMATED BLOOD LOSS:  100cc    PROCEDURE:  The patient is a 28-year-old  2, para 3  female, who is in with the above diagnoses. The patient was prepared under adequate spinal Duramorph anesthesia in left lateral tilt position supine with a Sanchez catheter in place, heart beats in right lower quadrant just wide of the umbilicus at 868 beats per minute. After prepping and draping and an appropriate time-out was accomplished and agreed upon by all parties and patient having received her Ancef prophylaxis, the anterior abdominal wall was taken down through a previous Pfannenstiel incision sharply with coagulation of bleeders. Peritoneum was entered sharply without difficulty.   It should be mentioned that there was omentum that herniated through either peritoneum priorly, or it was not closed adhered to the right side of the incision and fascia. This was taken down with Bovie lap freeing mostly down to the prepared new area. We then entered the peritoneum and extended it bluntly. At this point, we were able to see a fibroid. It was adherent to omentum. The omentum was then released off the fibroid which was about 15 cm in dimension on a 1-inch stalk. It was the right fundus. It was away from the tube and the round ligament. We then proceeded to take the bladder flap down sharply. Placing a bladder blade, we took a sharp knife. We went through the mid area of lower segment uterus which was thinned out and extended transversely into previous Pfannenstiel incision line bluntly. We knew we had a foot at this entry. We entered with the Allis. A footling breech was delivered. At the delivery of the shoulders, three nuchal cords were reduced from the neck. Baby was bulb suctioned, to Neonatologist in attendance. Apgar scores were 9 and 9. It was a 7 pound 11 ounce female infant. The baby is to Neonatology in stable condition. Cord bloods were taken. The placenta was delivered three-vessel intact and disposed off routinely. The uterine confines were wiped with a dry lap for the remains of amniotic tissues. Lower segment delineated patent with a ring forceps, sent off as a dirty instrument. Next, patient was given Pitocin and 0.2 of Methergine to get and maintain uterine tone. Once established, low-transverse incision was closed as follows, #1 chromic running interlock stitch through-and-through myometrium imbricating suture. We used interrupted [id number] to get hemostasis. This was #1 chromic suture. Bladder flap was replaced at the source of the uterus utilizing a 2-0 chromic on a running interlock stitch. Hemostasis was noted.   We then addressed the fibroid which had been counseled to the patient for that we might remove it if we felt it was accessible, and it was. Utilizing the Bovie coagulator and a wedge resection, we took down the pedunculated area of the fibroid removing all of the fibroid identifiable, submitted to this to Pathology, and closed the defect area with interrupted [id number] of #1 chromic suture. Hemostasis was noted. Next, after a correct needle, sponge, and instrument count, an estimated blood loss of about 1000 mL, copious amounts of irrigation were utilized to cleanse pelvic and lower abdominal recess. We then addressed the omental adherences to the superior aspect of the peritoneal lining. We took off the peritoneum utilizing the Bovie along its insertion line freeing up the omentum entirely. Entire abdomen, lower area, and the pelvic area now are free of adherences. We reinspected the fibroidectomy and low-transverse incision site. They were hemostatic. At this point, after correct needle, sponge, and instrument count, we then closed the anterior wall as follows, 0 chromic for reperitonealization, #1 chromic running stitch for replacement of the diastasis, #1 Vicryl running interlock stitch for fascial closure, 2-0 Vicryl running stitch in horizontal fashion for closure of the subcutaneous area, and a subcuticular stitch of 3-0 Monocryl. A pressure bandage was applied. A 1000 mcg of Cytotec was placed rectally for uterine tone, and the patient was taken to recovery room in stabilized condition. Complications were none.       Kellie Lugo MD      [id number]  D:  11/13/2019 17:58  T:  11/14/2019 5:56  JOB #:  [id number]

## 2019-11-14 NOTE — PROGRESS NOTES
Bedside and Verbal shift change report given to STACEY Joseph RN (oncoming nurse) by ALICIA Mclaughlin LPN (offgoing nurse). Report included the following information SBAR, Kardex, Procedure Summary, Intake/Output, MAR and Recent Results. 1747: Shift reassessment performed, patient c/o shoulder and abd pain. Encouraged to drink plenty of fluids, ambulate, warm packs given, & meds administered. 1915: Bedside and Verbal shift change report given to THEA Vega RN (oncoming nurse) by STACEY Joseph RN (offgoing nurse). Report included the following information SBAR, Kardex, Procedure Summary, Intake/Output, MAR and Recent Results.

## 2019-11-14 NOTE — DISCHARGE SUMMARY
Discharge Summary set for 11/15/19    Patient: Kurtis Hilario               Sex: female          DOA: 2019         YOB: 1985      Age:  30 y.o.        LOS:  LOS: 1 day                Admit Date: 2019    Discharge Date: 2019    Admission Diagnoses: Labor and delivery, indication for care [O75.9]    Discharge Diagnoses:    Problem List as of 2019 Date Reviewed: 2019          Codes Class Noted - Resolved    RESOLVED: H/O  section ICD-10-CM: Z98.891  ICD-9-CM: V45.89  2019 - 2019        RESOLVED: 39 weeks gestation of pregnancy ICD-10-CM: Z3A.39  ICD-9-CM: V22.2  2019 - 2019        RESOLVED: Labor and delivery, indication for care ICD-10-CM: O75.9  ICD-9-CM: 659.90  2019 - 2019              Discharge Condition: Good    Hospital Course: b9    Consults: o    Activity: See surgical instructions    Diet: Regular Diet    Wound Care: As directed    Follow-up: 2 and 6 wk pop check hosp summary dictated # [id number].

## 2019-11-15 VITALS
WEIGHT: 231 LBS | RESPIRATION RATE: 18 BRPM | DIASTOLIC BLOOD PRESSURE: 81 MMHG | TEMPERATURE: 97.8 F | OXYGEN SATURATION: 100 % | HEART RATE: 98 BPM | SYSTOLIC BLOOD PRESSURE: 122 MMHG | BODY MASS INDEX: 36.26 KG/M2 | HEIGHT: 67 IN

## 2019-11-15 PROCEDURE — 74011250636 HC RX REV CODE- 250/636: Performed by: OBSTETRICS & GYNECOLOGY

## 2019-11-15 PROCEDURE — 74011250637 HC RX REV CODE- 250/637: Performed by: OBSTETRICS & GYNECOLOGY

## 2019-11-15 RX ORDER — KETOROLAC TROMETHAMINE 30 MG/ML
30 INJECTION, SOLUTION INTRAMUSCULAR; INTRAVENOUS EVERY 6 HOURS
Status: DISCONTINUED | OUTPATIENT
Start: 2019-11-15 | End: 2019-11-15

## 2019-11-15 RX ORDER — IBUPROFEN 400 MG/1
800 TABLET ORAL
Status: DISCONTINUED | OUTPATIENT
Start: 2019-11-15 | End: 2019-11-15 | Stop reason: HOSPADM

## 2019-11-15 RX ADMIN — OXYCODONE HYDROCHLORIDE AND ACETAMINOPHEN 1 TABLET: 5; 325 TABLET ORAL at 14:44

## 2019-11-15 RX ADMIN — IBUPROFEN 800 MG: 400 TABLET ORAL at 06:31

## 2019-11-15 RX ADMIN — OXYCODONE HYDROCHLORIDE AND ACETAMINOPHEN 1 TABLET: 5; 325 TABLET ORAL at 00:57

## 2019-11-15 RX ADMIN — OXYCODONE HYDROCHLORIDE AND ACETAMINOPHEN 1 TABLET: 5; 325 TABLET ORAL at 10:48

## 2019-11-15 RX ADMIN — KETOROLAC TROMETHAMINE 30 MG: 30 INJECTION, SOLUTION INTRAMUSCULAR at 00:56

## 2019-11-15 RX ADMIN — IBUPROFEN 800 MG: 400 TABLET ORAL at 14:51

## 2019-11-15 NOTE — PROGRESS NOTES
Problem: Pain  Goal: *Control of Pain  11/15/2019 1530 by Panfilo JC RN  Outcome: Progressing Towards Goal  11/15/2019 1215 by Sierra Gonzáles RN  Outcome: Progressing Towards Goal     Problem:  Delivery: Postpartum Day 3  Goal: Activity/Safety  Outcome: Progressing Towards Goal  Goal: Nutrition/Diet  Outcome: Progressing Towards Goal  Goal: Discharge Planning  Outcome: Progressing Towards Goal  Goal: Medications  Outcome: Progressing Towards Goal  Goal: Treatments/Interventions/Procedures  Outcome: Progressing Towards Goal  Goal: Psychosocial  Outcome: Progressing Towards Goal     Problem:  Delivery: Discharge Outcomes  Goal: *Follow-up appointments as indicated  Outcome: Progressing Towards Goal  Goal: *Describes available resources and support systems  Outcome: Progressing Towards Goal  Goal: *No signs and symptoms of infection  Outcome: Progressing Towards Goal  Goal: *Birth certificate information completed  Outcome: Progressing Towards Goal  Goal: *Received and verbalizes understanding of discharge plan and instructions  Outcome: Progressing Towards Goal  Goal: *Vital signs within defined limits  Outcome: Progressing Towards Goal  Goal: *Labs within defined limits  Outcome: Progressing Towards Goal  Goal: *Hemodynamically stable  Outcome: Progressing Towards Goal  Goal: *Optimal pain control at patient's stated goal  Outcome: Progressing Towards Goal  Goal: *Participates in infant care  Outcome: Progressing Towards Goal  Goal: *Demonstrates progressive activity  Outcome: Progressing Towards Goal  Goal: *Appropriate parent-infant bonding  Outcome: Progressing Towards Goal  Goal: *Tolerating diet  Outcome: Progressing Towards Goal  Goal: *Verbalizes name, dosage, time, side effects, and number of days to continue medications  Outcome: Progressing Towards Goal  Goal: *Influenza vaccine administered (October-March)  Outcome: Progressing Towards Goal

## 2019-11-15 NOTE — PROGRESS NOTES
0700 Bedside and Verbal shift change report given to REGGIE Gonzáles RN (oncoming nurse) by DARLENE Kohler RN (offgoing nurse). Report included the following information SBAR, Kardex, Procedure Summary, Intake/Output, MAR and Recent Results. 9769 Rounding complete, mother to feed     7178 Patient up to shower, family member at bedside    21  Patient removed incisional dressing in shower, provided patient with lanolin cream, mother to feed     0 1 percocet given for pain 6/10, abd binder given to patient    80 Spoke to mother and family member about follow up peds appointment and options as far as reweigh and bilirubin check    1200 Rounding complete, mother breastfeeding      26 Formula provided to mom for supplementation and educated on infant's stomach size, WNL volume intake in , how to safely prepare formula, bottle hygiene, appropriate way to feed infant with bottle, and burping techniques. Handout given for reinforcement. Mom verbalized understanding and no questions at this time. 1500 Patient discharged via wheelchair per protocol. Patient in stable condition. Discharged education reviewed with discharge nurse DARLENE Wang RN and packet given to patient. Patient verbalizes understanding of discharge instructions. E-sign completed. Armbands removed and given to patient. No further needs or questions reported at this time.

## 2019-11-15 NOTE — LACTATION NOTE
Infant latched and nursing well. Breastfeeding discharge teaching completed to include feeding on demand, foremilk and hindmilk importance, engorgement, mastitis, clogged ducts, pumping, breastmilk storage, and returning to work. Information given about unit and office phone numbers and encouraged mom to reach out if concerns arise, but that [facility] would be calling her in the next few days to follow up on breastfeeding. Mom verbalized understanding and no questions at this time.

## 2019-11-15 NOTE — DISCHARGE INSTRUCTIONS
POST DELIVERY DISCHARGE INSTRUCTIONS    Name: Jamari Montesinos  YOB: 1985  Primary Diagnosis: Post C-section    Call your Obstetrician's Office as soon as possible to schedule an appointment for a 2 week incision/post partum check. General:     Diet/Diet Restrictions:  Eight 8-ounce glasses of fluid daily (water, juices); avoid excessive caffeine intake. Meals/snacks as desired which are high in fiber and carbohydrates and low in fat and cholesterol. Physical Activity / Restrictions / Safety:     Avoid heavy lifting, no more that 8 lbs. For 2-3 weeks; limit use of stairs to 2 times daily for the first week home. No driving for one week. Avoid intercourse 4-6 weeks, no douching or tampon use. Check with obstetrician before starting or resuming an exercise program.         Discharge Instructions/Special Treatment/Home Care Needs:     Continue prenatal vitamins. Continue to use squirt bottle with warm water on your episiotomy after each bathroom use until bleeding stops. If steri-strips applied to your incision, remove in 7-10 days. Call your doctor for the following:     Fever over 101 degrees by mouth. Vaginal bleeding heavier than a normal menstrual period or clot larger than a golf ball. Red streaks or increased swelling of legs, painful red streaks on your breast.  Painful urination, constipation and increased pain or swelling or discharge with your incision. If you feel extremely anxious or overwhelmed. If you have thoughts of harming yourself and/or your baby. Pain Management:     Pain Management:   Take Acetaminophen (Tylenol) or Ibuprofen (Advil, Motrin), as directed for pain. Use a warm Sitz bath 3 times daily to relieve episiotomy or hemorrhoidal discomfort. Heating pad to  incision as needed. For hemorrhoidal discomfort, use Tucks and Anusol cream as needed and directed. Follow-Up Care:      These are general instructions for a healthy lifestyle:    No smoking/ No tobacco products/ Avoid exposure to second hand smoke    Surgeon General's Warning:  Quitting smoking now greatly reduces serious risk to your health. Obesity, smoking, and sedentary lifestyle greatly increases your risk for illness    A healthy diet, regular physical exercise & weight monitoring are important for maintaining a healthy lifestyle    Recognize signs and symptoms of STROKE:    F-face looks uneven    A-arms unable to move or move unevenly    S-speech slurred or non-existent    T-time-call 911 as soon as signs and symptoms begin-DO NOT go       Back to bed or wait to see if you get better-TIME IS BRAIN.  Section: What to Expect at [address] [place] [address]    A  section, or , is surgery to deliver your baby through a cut, called an incision, that the doctor makes in your lower belly and uterus. You may have some pain in your lower belly and need pain medicine for 1 to 2 weeks. You can expect some vaginal bleeding for several weeks. You will probably need about 6 weeks to fully recover. It is important to take it easy while the incision is healing. Avoid heavy lifting, strenuous activities, or exercises that strain the belly muscles while you are recovering. Ask a family member or friend for help with housework, cooking, and shopping. This care sheet gives you a general idea about how long it will take for you to recover. But each person recovers at a different pace. Follow the steps below to get better as quickly as possible. How can you care for yourself at home? Activity    · Rest when you feel tired. Getting enough sleep will help you recover.     · Try to walk each day. Start by walking a little more than you did the day before. Bit by bit, increase the amount you walk.  Walking boosts blood flow and helps prevent pneumonia, constipation, and blood clots.     · Avoid strenuous activities, such as bicycle riding, jogging, weightlifting, and aerobic exercise, for 6 weeks or until your doctor says it is okay.     · Until your doctor says it is okay, do not lift anything heavier than your baby.     · Do not do sit-ups or other exercises that strain the belly muscles for 6 weeks or until your doctor says it is okay.     · Hold a pillow over your incision when you cough or take deep breaths. This will support your belly and decrease your pain.     · You may shower as usual. Pat the incision dry when you are done.     · You will have some vaginal bleeding. Wear sanitary pads. Do not douche or use tampons until your doctor says it is okay.     · Ask your doctor when you can drive again.     · You will probably need to take at least 6 weeks off work. It depends on the type of work you do and how you feel.     · Ask your doctor when it is okay for you to have sex. Diet    · You can eat your normal diet. If your stomach is upset, try bland, low-fat foods like plain rice, broiled chicken, toast, and yogurt.     · Drink plenty of fluids (unless your doctor tells you not to).     · You may notice that your bowel movements are not regular right after your surgery. This is common. Try to avoid constipation and straining with bowel movements. You may want to take a fiber supplement every day. If you have not had a bowel movement after a couple of days, ask your doctor about taking a mild laxative.     · If you are breastfeeding, limit alcohol. Alcohol can cause a lack of energy and other health problems for the baby when a breastfeeding woman drinks heavily. It can also get in the way of a mom's ability to feed her baby or to care for the child in other ways. There isn't a lot of research about exactly how much alcohol can harm a baby. Having no alcohol is the safest choice for your baby. If you choose to have a drink now and then, have only one drink, and limit the number of occasions that you have a drink.  Wait to breastfeed at least 2 hours after you have a drink to reduce the amount of alcohol the baby may get in the milk. Medicines    · Your doctor will tell you if and when you can restart your medicines. He or she will also give you instructions about taking any new medicines.     · If you take blood thinners, such as warfarin (Coumadin), clopidogrel (Plavix), or aspirin, be sure to talk to your doctor. He or she will tell you if and when to start taking those medicines again. Make sure that you understand exactly what your doctor wants you to do.     · Take pain medicines exactly as directed. ? If the doctor gave you a prescription medicine for pain, take it as prescribed. ? If you are not taking a prescription pain medicine, ask your doctor if you can take an over-the-counter medicine.     · If you think your pain medicine is making you sick to your stomach:  ? Take your medicine after meals (unless your doctor has told you not to). ? Ask your doctor for a different pain medicine.     · If your doctor prescribed antibiotics, take them as directed. Do not stop taking them just because you feel better. You need to take the full course of antibiotics. Incision care    · If you have strips of tape on the incision, leave the tape on for a week or until it falls off.     · Wash the area daily with warm, soapy water, and pat it dry. Don't use hydrogen peroxide or alcohol, which can slow healing. You may cover the area with a gauze bandage if it weeps or rubs against clothing. Change the bandage every day.     · Keep the area clean and dry. Other instructions    · If you breastfeed your baby, you may be more comfortable while you are healing if you place the baby so that he or she is not resting on your belly. Try tucking your baby under your arm, with his or her body along the side you will be feeding on. Support your baby's upper body with your arm.  With that hand you can control your baby's head to bring his or her mouth to your breast. This is sometimes called the football hold. Follow-up care is a key part of your treatment and safety. Be sure to make and go to all appointments, and call your doctor if you are having problems. It's also a good idea to know your test results and keep a list of the medicines you take. When should you call for help? Call 911 anytime you think you may need emergency care. For example, call if:    · You have thoughts of harming yourself, your baby, or another person.     · You passed out (lost consciousness).     · You have chest pain, are short of breath, or cough up blood.     · You have a seizure.    Call your doctor now or seek immediate medical care if:    · You have pain that does not get better after you take pain medicine.     · You have severe vaginal bleeding.     · You are dizzy or lightheaded, or you feel like you may faint.     · You have new or worse pain in your belly or pelvis.     · You have loose stitches, or your incision comes open.     · You have symptoms of infection, such as:  ? Increased pain, swelling, warmth, or redness. ? Red streaks leading from the incision. ? Pus draining from the incision. ? A fever.     · You have symptoms of a blood clot in your leg (called a deep vein thrombosis), such as:  ? Pain in your calf, back of the knee, thigh, or groin. ? Redness and swelling in your leg or groin.     · You have signs of preeclampsia, such as:  ? Sudden swelling of your face, hands, or feet. ? New vision problems (such as dimness, blurring, or seeing spots). ? A severe headache.    Watch closely for changes in your health, and be sure to contact your doctor if:    · You do not get better as expected. Where can you learn more? Go to http://wilfred-jack.info/. Enter M806 in the search box to learn more about \" Section: What to Expect at Home. \"  Current as of: May 29, 2019  Content Version: 12.2  © 9986-8488 [employer].  Care instructions adapted under license by [employer] (which disclaims liability or warranty for this information). If you have questions about a medical condition or this instruction, always ask your healthcare professional. [place] [address] any warranty or liability for your use of this information.   Patient armband removed and shredded

## 2019-11-15 NOTE — PROGRESS NOTES
1910 Bedside and Verbal shift change report given to THEA Stoll RN (oncoming nurse) by STACEY Joseph RN (offgoing nurse). Report included the following information SBAR, Kardex, Intake/Output, MAR and Recent Results. 2100 assessment complete   7138 mylicon given for gas pain and 1 percocet given for pain 5/10 in right shoulder. 2228 pt with infant latched at this time. No other needs to be addressed at this time. 2300 Bedside and Verbal shift change report given to DARLENE Gray RN (oncoming nurse) by Radha Tony RN (offgoing nurse). Report included the following information SBAR, Kardex, Intake/Output, MAR and Recent Results.

## 2019-11-15 NOTE — ROUTINE PROCESS
Bedside and Verbal shift change report given to REGGIE Gonzáles RN (oncoming nurse) by DARLENE Hanson RN (offgoing nurse). Report included the following information SBAR, Kardex, Intake/Output, MAR and Recent Results.

## 2019-11-15 NOTE — PROGRESS NOTES
Problem: Pain  Goal: *Control of Pain  Outcome: Progressing Towards Goal     Problem:  Delivery: Postpartum Day 3  Goal: Activity/Safety  Outcome: Progressing Towards Goal  Goal: Nutrition/Diet  Outcome: Progressing Towards Goal  Goal: Discharge Planning  Outcome: Progressing Towards Goal  Goal: Medications  Outcome: Progressing Towards Goal  Goal: Treatments/Interventions/Procedures  Outcome: Progressing Towards Goal  Goal: Psychosocial  Outcome: Progressing Towards Goal

## 2019-11-15 NOTE — PROGRESS NOTES
Time spent with patient reviewing discharge instructions to include the following information that was also provided in written form to the patient; normal vaginal bleeding and what to expect how to care for perineum and how to respond should she experience an increase in vaginal bleeding. Patient instructed that she should not lift more than the weight of her baby for at least 2 weeks as she delivered by  section. She was encouraged to stay hydrated and informed that she should not have sex, douche, use tampons,  or place anything in the vagina until her postpartum visit. Additionally she was instructed not to use tub baths, hot tubs or pools until cleared by her midwife or physician. Patient was informed that some swelling of her legs can be expected after delivery and to elevate them whenever possible. If the swelling increases or she has signs of calf pain/tenderness, or redness that is present in one leg more than the other she is to notify her provider or present in the emergency department for evaluation if unable to reach provider. Patients having delivered by  section was instructed not to drive for the first 2 weeks, to only go up and down stairs 1 time in a day for 2 weeks as this will increase her risk for the incision to open. She was also instructed not to scrub the incision but to allow soap and water to fall onto it and to pat dry. Patient was given signs and symptoms of infection and instructed to call provider with temperature equal to or > than 100.4, foul smelling blood or discharge from the vagina, and increase in redness or discharge from incisions or an open wound that is not healing.   Patient was also instructed on the signs of postpartum depression and instructed that if she is considering harming herself or her infant, feels out of control, unable to care for herself or baby, feels sad or depressed most of the day every day, is having trouble sleeping or sleeping too much or is having trouble bonding with her baby she is to call her provider or present in the emergency department. Patient was also provided with signs and symptoms of a pulmonary embolism (PE). She was told what a PE is and instructed to call 911 if she experiences SOB (fast, shallow, rapid respirations) at rest, chest pain that worsens when coughing or change in her level of consciousness. Patient was informed that she might experience blood pressure changes during the postpartum weeks and that she should contact her provider with any severe, constant headaches that do not respond to over-the-counter pain medication, rest and/or hydration. She is also to call her provider with any changes in vision, seeing spots, or flashing lights, pain in the upper right quadrant of the abdomen, swelling of the face, hands, and/or legs more than what is expected or a change in her level of consciousness. Patient instructed to call her OB office to schedule an appointment for a 2 week incision/postpartum check. Also emphasized the importance of telling all providers her delivery date up until one year after the birth of her baby. Date of postpartum visit was confirmed prior to delivery. All questions were answered and patient voiced understanding of the information provided.

## 2019-11-15 NOTE — PROGRESS NOTES
Problem:  Delivery: Postpartum Day 2  Goal: Activity/Safety  Outcome: Progressing Towards Goal  Goal: Nutrition/Diet  Outcome: Progressing Towards Goal  Goal: Discharge Planning  Outcome: Progressing Towards Goal  Goal: Medications  Outcome: Progressing Towards Goal  Goal: Treatments/Interventions/Procedures  Outcome: Progressing Towards Goal  Goal: Psychosocial  Outcome: Progressing Towards Goal  Goal: *Vital signs within defined limits  Outcome: Progressing Towards Goal  Goal: *Labs within defined limits  Outcome: Progressing Towards Goal  Goal: *Hemodynamically stable  Outcome: Progressing Towards Goal  Goal: *Optimal pain control at patient's stated goal  Outcome: Progressing Towards Goal  Goal: *Participates in infant care  Outcome: Progressing Towards Goal  Goal: *Demonstrates progressive activity  Outcome: Progressing Towards Goal  Goal: *Appropriate parent-infant bonding  Outcome: Progressing Towards Goal  Goal: *Tolerating diet  Outcome: Progressing Towards Goal

## (undated) DEVICE — MUCUS TRAP WITH VACUUM BREAKER AND FILTER,10 FR/CH (3.33 MM), CATHETER: Brand: ARGYLE

## (undated) DEVICE — SUT CHRMC 3-0 36IN V34 BRN --

## (undated) DEVICE — 3L THIN WALL CAN: Brand: CRD

## (undated) DEVICE — PACK PROCEDURE SURG BIRTH

## (undated) DEVICE — SUT VCRL + 2-0 36IN CT1 UD --

## (undated) DEVICE — REM POLYHESIVE ADULT PATIENT RETURN ELECTRODE: Brand: VALLEYLAB

## (undated) DEVICE — SUT CHRMC 1 36IN CT1 BRN --

## (undated) DEVICE — SUTURE CHROMIC GUT SZ 2-0 L36IN ABSRB BRN L36MM CT-1 1/2 923H

## (undated) DEVICE — INTENDED FOR TISSUE SEPARATION, AND OTHER PROCEDURES THAT REQUIRE A SHARP SURGICAL BLADE TO PUNCTURE OR CUT.: Brand: BARD-PARKER ® CARBON RIB-BACK BLADES

## (undated) DEVICE — GARMENT,MEDLINE,DVT,INT,CALF,MED, GEN2: Brand: MEDLINE

## (undated) DEVICE — STRAP,POSITIONING,KNEE/BODY,FOAM,4X60": Brand: MEDLINE

## (undated) DEVICE — SUTURE MCRYL SZ 3-0 L27IN ABSRB UD L24MM PS-1 3/8 CIR PRIM Y936H

## (undated) DEVICE — SUT VCRL + 1 36IN CT1 VIO --